# Patient Record
Sex: FEMALE | Race: WHITE | NOT HISPANIC OR LATINO | URBAN - METROPOLITAN AREA
[De-identification: names, ages, dates, MRNs, and addresses within clinical notes are randomized per-mention and may not be internally consistent; named-entity substitution may affect disease eponyms.]

---

## 2017-02-10 ENCOUNTER — OUTPATIENT (OUTPATIENT)
Dept: OUTPATIENT SERVICES | Facility: HOSPITAL | Age: 77
LOS: 1 days | End: 2017-02-10

## 2017-02-10 ENCOUNTER — OUTPATIENT (OUTPATIENT)
Dept: OUTPATIENT SERVICES | Facility: HOSPITAL | Age: 77
LOS: 1 days | End: 2017-02-10
Payer: MEDICARE

## 2017-02-10 VITALS
WEIGHT: 184.97 LBS | HEART RATE: 63 BPM | DIASTOLIC BLOOD PRESSURE: 61 MMHG | SYSTOLIC BLOOD PRESSURE: 135 MMHG | TEMPERATURE: 98 F | RESPIRATION RATE: 14 BRPM

## 2017-02-10 DIAGNOSIS — Z87.59 PERSONAL HISTORY OF OTHER COMPLICATIONS OF PREGNANCY, CHILDBIRTH AND THE PUERPERIUM: Chronic | ICD-10-CM

## 2017-02-10 DIAGNOSIS — R85.613 HIGH GRADE SQUAMOUS INTRAEPITHELIAL LESION ON CYTOLOGIC SMEAR OF ANUS (HGSIL): ICD-10-CM

## 2017-02-10 DIAGNOSIS — Z98.49 CATARACT EXTRACTION STATUS, UNSPECIFIED EYE: Chronic | ICD-10-CM

## 2017-02-10 DIAGNOSIS — K62.9 DISEASE OF ANUS AND RECTUM, UNSPECIFIED: Chronic | ICD-10-CM

## 2017-02-10 DIAGNOSIS — Z41.9 ENCOUNTER FOR PROCEDURE FOR PURPOSES OTHER THAN REMEDYING HEALTH STATE, UNSPECIFIED: Chronic | ICD-10-CM

## 2017-02-10 DIAGNOSIS — Z98.89 OTHER SPECIFIED POSTPROCEDURAL STATES: Chronic | ICD-10-CM

## 2017-02-10 DIAGNOSIS — Z98.890 OTHER SPECIFIED POSTPROCEDURAL STATES: Chronic | ICD-10-CM

## 2017-02-10 DIAGNOSIS — Z01.818 ENCOUNTER FOR OTHER PREPROCEDURAL EXAMINATION: ICD-10-CM

## 2017-02-10 DIAGNOSIS — E55.9 VITAMIN D DEFICIENCY, UNSPECIFIED: ICD-10-CM

## 2017-02-10 DIAGNOSIS — E78.00 PURE HYPERCHOLESTEROLEMIA, UNSPECIFIED: ICD-10-CM

## 2017-02-10 LAB
ALBUMIN SERPL ELPH-MCNC: 3.4 G/DL — SIGNIFICANT CHANGE UP (ref 3.3–5)
ALP SERPL-CCNC: 78 U/L — SIGNIFICANT CHANGE UP (ref 40–120)
ALT FLD-CCNC: 29 U/L — SIGNIFICANT CHANGE UP (ref 12–78)
ANION GAP SERPL CALC-SCNC: 8 MMOL/L — SIGNIFICANT CHANGE UP (ref 5–17)
AST SERPL-CCNC: 28 U/L — SIGNIFICANT CHANGE UP (ref 15–37)
BILIRUB SERPL-MCNC: 0.6 MG/DL — SIGNIFICANT CHANGE UP (ref 0.2–1.2)
BUN SERPL-MCNC: 17 MG/DL — SIGNIFICANT CHANGE UP (ref 7–23)
CALCIUM SERPL-MCNC: 8.9 MG/DL — SIGNIFICANT CHANGE UP (ref 8.5–10.1)
CHLORIDE SERPL-SCNC: 104 MMOL/L — SIGNIFICANT CHANGE UP (ref 96–108)
CO2 SERPL-SCNC: 29 MMOL/L — SIGNIFICANT CHANGE UP (ref 22–31)
CREAT SERPL-MCNC: 0.72 MG/DL — SIGNIFICANT CHANGE UP (ref 0.5–1.3)
GLUCOSE SERPL-MCNC: 92 MG/DL — SIGNIFICANT CHANGE UP (ref 70–99)
HCT VFR BLD CALC: 45.3 % — HIGH (ref 34.5–45)
HGB BLD-MCNC: 14.5 G/DL — SIGNIFICANT CHANGE UP (ref 11.5–15.5)
MCHC RBC-ENTMCNC: 31.5 PG — SIGNIFICANT CHANGE UP (ref 27–34)
MCHC RBC-ENTMCNC: 32 GM/DL — SIGNIFICANT CHANGE UP (ref 32–36)
MCV RBC AUTO: 98.4 FL — SIGNIFICANT CHANGE UP (ref 80–100)
PLATELET # BLD AUTO: 253 K/UL — SIGNIFICANT CHANGE UP (ref 150–400)
POTASSIUM SERPL-MCNC: 4.2 MMOL/L — SIGNIFICANT CHANGE UP (ref 3.5–5.3)
POTASSIUM SERPL-SCNC: 4.2 MMOL/L — SIGNIFICANT CHANGE UP (ref 3.5–5.3)
PROT SERPL-MCNC: 7.4 G/DL — SIGNIFICANT CHANGE UP (ref 6–8.3)
RBC # BLD: 4.6 M/UL — SIGNIFICANT CHANGE UP (ref 3.8–5.2)
RBC # FLD: 13.2 % — SIGNIFICANT CHANGE UP (ref 10.3–14.5)
SODIUM SERPL-SCNC: 141 MMOL/L — SIGNIFICANT CHANGE UP (ref 135–145)
WBC # BLD: 6.6 K/UL — SIGNIFICANT CHANGE UP (ref 3.8–10.5)
WBC # FLD AUTO: 6.6 K/UL — SIGNIFICANT CHANGE UP (ref 3.8–10.5)

## 2017-02-10 PROCEDURE — 93005 ELECTROCARDIOGRAM TRACING: CPT

## 2017-02-10 PROCEDURE — 80061 LIPID PANEL: CPT

## 2017-02-10 PROCEDURE — 85027 COMPLETE CBC AUTOMATED: CPT

## 2017-02-10 PROCEDURE — G0463: CPT

## 2017-02-10 PROCEDURE — 80053 COMPREHEN METABOLIC PANEL: CPT

## 2017-02-10 PROCEDURE — 93010 ELECTROCARDIOGRAM REPORT: CPT | Mod: NC

## 2017-02-10 PROCEDURE — 82306 VITAMIN D 25 HYDROXY: CPT

## 2017-02-10 NOTE — H&P PST ADULT - PMH
Anal lesion  biopsy in April 2016 and EUA July 2016  Anxiety    Essential hypertension    Kidney stone    Macular degeneration    Uncomplicated asthma, unspecified asthma severity Anal lesion  biopsy in April 2016 and EUA (July & October 2016)  Anxiety    Essential hypertension    Macular degeneration  (Right eye)  Uncomplicated asthma, unspecified asthma severity Anal lesion  biopsy in April 2016 and EUA (July & October 2016)  Anxiety    Essential hypertension    High grade squamous intraepithelial lesion on cytologic smear of anus (HGSIL)    Macular degeneration  (Right eye)  Uncomplicated asthma, unspecified asthma severity

## 2017-02-10 NOTE — H&P PST ADULT - HISTORY OF PRESENT ILLNESS
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 Needs another surgery to take more tissue from the area to avoid radiation. Denies any pain or bleeding during bowel movement.   pre op testing today. 76year old female s/p rectal biopsy done in April 2016, was told it is cancer in situ. Pt had surgery s/p rectal exam under anesthesia biopsy of anal lesion in July and October 2016. Pt denies abdominal pain and denies melena and hematochezia. Pt electing for biopsy of canal with high resolution anoscopy, excision and fulguration of anal condyloma on 2/21/17.

## 2017-02-10 NOTE — H&P PST ADULT - NSANTHOSAYNRD_GEN_A_CORE
No. SARAH screening performed.  STOP BANG Legend: 0-2 = LOW Risk; 3-4 = INTERMEDIATE Risk; 5-8 = HIGH Risk

## 2017-02-10 NOTE — H&P PST ADULT - GASTROINTESTINAL DETAILS
no masses palpable/nontender/soft/no distention/bowel sounds normal/normal no masses palpable/no distention/nontender/bowel sounds normal/no rigidity/no bruit/soft/no rebound tenderness/no guarding/no organomegaly/normal

## 2017-02-10 NOTE — H&P PST ADULT - ASSESSMENT
74 y/o female with anal mass, scheduled for biopsy of anal canal with high grade resolution anoscopy, excision and fulguration anal condyloma.  pre op testing today. Medical and cardiac eval pre op. 75yo female with high grade squamous intraepithelial lesion on cytologic smear of anus.

## 2017-02-10 NOTE — H&P PST ADULT - PSH
Anal lesion  biopsy in April 2016  S/P ectopic pregnancy    S/P knee surgery    S/P removal of ovarian cyst    Status post cataract extraction, unspecified laterality Anal lesion  biopsy in April 2016  S/P ectopic pregnancy    S/P knee surgery  (Left knee, 6/2014)  S/P myomectomy    Status post cataract extraction, unspecified laterality  (2000) Anal lesion  biopsy in April 2016  Elective surgery  (Biopsy of anal canal, 7/2016 & 10/2016)  S/P ectopic pregnancy    S/P knee surgery  (Left knee, 6/2014)  S/P myomectomy    Status post cataract extraction, unspecified laterality  (2000)

## 2017-02-10 NOTE — H&P PST ADULT - NEGATIVE CARDIOVASCULAR SYMPTOMS
no chest pain/no claudication/no peripheral edema/no orthopnea/no paroxysmal nocturnal dyspnea/no dyspnea on exertion/no palpitations

## 2017-02-10 NOTE — H&P PST ADULT - PROBLEM SELECTOR PLAN 1
Scheduled for biopsy of anal canal with high grade resolution anoscopy, excision and fulguration anal condyloma. Biopsy of canal with high resolution anoscopy, excision and fulguration of anal condyloma on 2/21/17.   Medical clearance needed.   CBC, Comprehensive panel & EKG ordered.   Pre-op instructions given and pt verbalized understanding. Biopsy of canal with high resolution anoscopy, excision and fulguration of anal condyloma on 2/21/17. Bowel prep given to pt by surgeon.   Medical clearance needed.   CBC, Comprehensive panel & EKG ordered.   Pre-op instructions given and pt verbalized understanding.

## 2017-02-20 ENCOUNTER — RESULT REVIEW (OUTPATIENT)
Age: 77
End: 2017-02-20

## 2017-02-21 ENCOUNTER — APPOINTMENT (OUTPATIENT)
Dept: COLORECTAL SURGERY | Facility: HOSPITAL | Age: 77
End: 2017-02-21

## 2017-02-21 ENCOUNTER — OUTPATIENT (OUTPATIENT)
Dept: OUTPATIENT SERVICES | Facility: HOSPITAL | Age: 77
LOS: 1 days | Discharge: HOME | End: 2017-02-21
Payer: MEDICARE

## 2017-02-21 VITALS
WEIGHT: 184.97 LBS | SYSTOLIC BLOOD PRESSURE: 162 MMHG | HEART RATE: 69 BPM | TEMPERATURE: 98 F | DIASTOLIC BLOOD PRESSURE: 82 MMHG | RESPIRATION RATE: 16 BRPM | OXYGEN SATURATION: 98 %

## 2017-02-21 VITALS
RESPIRATION RATE: 14 BRPM | OXYGEN SATURATION: 97 % | HEART RATE: 66 BPM | SYSTOLIC BLOOD PRESSURE: 130 MMHG | DIASTOLIC BLOOD PRESSURE: 70 MMHG

## 2017-02-21 DIAGNOSIS — K62.9 DISEASE OF ANUS AND RECTUM, UNSPECIFIED: Chronic | ICD-10-CM

## 2017-02-21 DIAGNOSIS — Z41.9 ENCOUNTER FOR PROCEDURE FOR PURPOSES OTHER THAN REMEDYING HEALTH STATE, UNSPECIFIED: Chronic | ICD-10-CM

## 2017-02-21 DIAGNOSIS — Z98.49 CATARACT EXTRACTION STATUS, UNSPECIFIED EYE: Chronic | ICD-10-CM

## 2017-02-21 DIAGNOSIS — Z98.89 OTHER SPECIFIED POSTPROCEDURAL STATES: Chronic | ICD-10-CM

## 2017-02-21 DIAGNOSIS — Z87.59 PERSONAL HISTORY OF OTHER COMPLICATIONS OF PREGNANCY, CHILDBIRTH AND THE PUERPERIUM: Chronic | ICD-10-CM

## 2017-02-21 DIAGNOSIS — R85.613 HIGH GRADE SQUAMOUS INTRAEPITHELIAL LESION ON CYTOLOGIC SMEAR OF ANUS (HGSIL): ICD-10-CM

## 2017-02-21 DIAGNOSIS — Z98.890 OTHER SPECIFIED POSTPROCEDURAL STATES: Chronic | ICD-10-CM

## 2017-02-21 PROCEDURE — 46930 DESTROY INTERNAL HEMORRHOIDS: CPT

## 2017-02-21 PROCEDURE — C1889: CPT

## 2017-02-21 PROCEDURE — 46607 DIAGNOSTIC ANOSCOPY & BIOPSY: CPT

## 2017-02-21 PROCEDURE — 46922 EXCISION OF ANAL LESION(S): CPT

## 2017-02-21 PROCEDURE — 46604 ANOSCOPY AND DILATION: CPT

## 2017-02-21 PROCEDURE — 88305 TISSUE EXAM BY PATHOLOGIST: CPT

## 2017-02-21 PROCEDURE — 88305 TISSUE EXAM BY PATHOLOGIST: CPT | Mod: 26

## 2017-02-21 RX ORDER — SIMVASTATIN 20 MG/1
1 TABLET, FILM COATED ORAL
Qty: 0 | Refills: 0 | COMMUNITY

## 2017-02-21 RX ORDER — SODIUM CHLORIDE 9 MG/ML
1000 INJECTION, SOLUTION INTRAVENOUS
Qty: 0 | Refills: 0 | Status: DISCONTINUED | OUTPATIENT
Start: 2017-02-21 | End: 2017-02-21

## 2017-02-21 RX ORDER — ONDANSETRON 8 MG/1
4 TABLET, FILM COATED ORAL ONCE
Qty: 0 | Refills: 0 | Status: DISCONTINUED | OUTPATIENT
Start: 2017-02-21 | End: 2017-02-21

## 2017-02-21 RX ADMIN — SODIUM CHLORIDE 75 MILLILITER(S): 9 INJECTION, SOLUTION INTRAVENOUS at 12:35

## 2017-02-21 NOTE — BRIEF OPERATIVE NOTE - PROCEDURE
High resolution anoscopy with biopsy  02/21/2017    Active  CSANZ  Excision and fulguration, condyloma  02/21/2017  Anal lesion  Active  CSANZ

## 2017-02-21 NOTE — ASU PATIENT PROFILE, ADULT - PSH
Anal lesion  biopsy in April 2016  Elective surgery  (Biopsy of anal canal, 7/2016 & 10/2016)  S/P ectopic pregnancy    S/P knee surgery  (Left knee, 6/2014)  S/P myomectomy    Status post cataract extraction, unspecified laterality  (2000)

## 2017-02-21 NOTE — ASU DISCHARGE PLAN (ADULT/PEDIATRIC). - ASU FOLLOWUP
[phone] or go to the nearest Emergency Room EMELI BRYANT (Adult):/[facility] [phone]

## 2017-02-21 NOTE — ASU PATIENT PROFILE, ADULT - PMH
Anal lesion  biopsy in April 2016 and EUA (July & October 2016)  Anxiety    Essential hypertension    High grade squamous intraepithelial lesion on cytologic smear of anus (HGSIL)    Macular degeneration  (Right eye)  Uncomplicated asthma, unspecified asthma severity

## 2017-02-21 NOTE — BRIEF OPERATIVE NOTE - POST-OP DX
High grade dysplasia of anus  02/21/2017    Migel Ridley  Squamous cell cancer, anus  02/21/2017  Hisotry of  Migel Ridley

## 2017-02-21 NOTE — ASU DISCHARGE PLAN (ADULT/PEDIATRIC). - MEDICATION SUMMARY - MEDICATIONS TO TAKE
I will START or STAY ON the medications listed below when I get home from the hospital:    vitoryn  --  by mouth every other day (at bedtime)  -- Indication: For High grade squamous intraepithelial lesion on cytologic smear of anus (HGSIL)    aspirin 81 mg oral tablet  -- 1 tab(s) by mouth once a day  -- Indication: For HTN    lisinopril 20 mg oral tablet  -- 1 tab(s) by mouth once a day  -- Indication: For Htn    CeleXA 20 mg oral tablet  -- 1 tab(s) by mouth once a day  -- Indication: For aNTIDEPRESSANT    ALPRAZolam 0.5 mg oral tablet  -- 1 tab(s) by mouth as needed  -- Indication: For aNTI ANXIETY    Symbicort 160 mcg-4.5 mcg/inh inhalation aerosol  -- 2 puff(s) inhaled 2 times a day, As Needed  -- Indication: For bRONCHDILATORS    hydrochlorothiazide 25 mg oral tablet  -- 1 tab(s) by mouth once a day  -- Indication: For Htn    Klor-Con 10 mEq oral tablet, extended release  -- 1 tab(s) by mouth once a day  -- Indication: For Potassium    Fish Oil oral capsule  --  by mouth   -- Indication: For supplements    multivitamin  --   once a day  -- Indication: For Vitamins

## 2017-02-21 NOTE — BRIEF OPERATIVE NOTE - PRE-OP DX
High grade dysplasia of anus  02/21/2017    Active  Migel Charlton  Squamous cell cancer, anus  02/21/2017  History of  Active  Migel Charlton

## 2017-02-22 ENCOUNTER — TRANSCRIPTION ENCOUNTER (OUTPATIENT)
Age: 77
End: 2017-02-22

## 2017-02-22 LAB — SURGICAL PATHOLOGY FINAL REPORT - CH: SIGNIFICANT CHANGE UP

## 2017-02-24 DIAGNOSIS — Z79.891 LONG TERM (CURRENT) USE OF OPIATE ANALGESIC: ICD-10-CM

## 2017-02-24 DIAGNOSIS — Z88.1 ALLERGY STATUS TO OTHER ANTIBIOTIC AGENTS STATUS: ICD-10-CM

## 2017-02-24 DIAGNOSIS — Z91.013 ALLERGY TO SEAFOOD: ICD-10-CM

## 2017-02-24 DIAGNOSIS — Z79.82 LONG TERM (CURRENT) USE OF ASPIRIN: ICD-10-CM

## 2017-02-24 DIAGNOSIS — R85.613 HIGH GRADE SQUAMOUS INTRAEPITHELIAL LESION ON CYTOLOGIC SMEAR OF ANUS (HGSIL): ICD-10-CM

## 2017-02-24 DIAGNOSIS — Z79.899 OTHER LONG TERM (CURRENT) DRUG THERAPY: ICD-10-CM

## 2017-02-24 DIAGNOSIS — I10 ESSENTIAL (PRIMARY) HYPERTENSION: ICD-10-CM

## 2017-02-24 DIAGNOSIS — K62.89 OTHER SPECIFIED DISEASES OF ANUS AND RECTUM: ICD-10-CM

## 2017-02-24 DIAGNOSIS — F41.9 ANXIETY DISORDER, UNSPECIFIED: ICD-10-CM

## 2017-02-24 DIAGNOSIS — Z88.0 ALLERGY STATUS TO PENICILLIN: ICD-10-CM

## 2017-02-24 DIAGNOSIS — J45.909 UNSPECIFIED ASTHMA, UNCOMPLICATED: ICD-10-CM

## 2017-02-24 DIAGNOSIS — Z88.5 ALLERGY STATUS TO NARCOTIC AGENT: ICD-10-CM

## 2017-02-24 DIAGNOSIS — Z85.048 PERSONAL HISTORY OF OTHER MALIGNANT NEOPLASM OF RECTUM, RECTOSIGMOID JUNCTION, AND ANUS: ICD-10-CM

## 2017-02-24 DIAGNOSIS — Z79.51 LONG TERM (CURRENT) USE OF INHALED STEROIDS: ICD-10-CM

## 2017-03-01 ENCOUNTER — OTHER (OUTPATIENT)
Age: 77
End: 2017-03-01

## 2017-03-15 ENCOUNTER — APPOINTMENT (OUTPATIENT)
Dept: COLORECTAL SURGERY | Facility: CLINIC | Age: 77
End: 2017-03-15

## 2017-03-15 VITALS
BODY MASS INDEX: 29.32 KG/M2 | WEIGHT: 176 LBS | OXYGEN SATURATION: 98 % | HEART RATE: 62 BPM | RESPIRATION RATE: 14 BRPM | HEIGHT: 65 IN | TEMPERATURE: 98.3 F | DIASTOLIC BLOOD PRESSURE: 91 MMHG | SYSTOLIC BLOOD PRESSURE: 195 MMHG

## 2017-03-15 DIAGNOSIS — K62.82 DYSPLASIA OF ANUS: ICD-10-CM

## 2017-03-15 DIAGNOSIS — C21.0 MALIGNANT NEOPLASM OF ANUS, UNSPECIFIED: ICD-10-CM

## 2017-03-21 ENCOUNTER — APPOINTMENT (OUTPATIENT)
Dept: COLORECTAL SURGERY | Facility: HOSPITAL | Age: 77
End: 2017-03-21

## 2017-06-07 ENCOUNTER — APPOINTMENT (OUTPATIENT)
Dept: COLORECTAL SURGERY | Facility: CLINIC | Age: 77
End: 2017-06-07

## 2017-11-09 ENCOUNTER — OUTPATIENT (OUTPATIENT)
Dept: OUTPATIENT SERVICES | Facility: HOSPITAL | Age: 77
LOS: 1 days | End: 2017-11-09
Payer: MEDICARE

## 2017-11-09 VITALS
TEMPERATURE: 98 F | HEART RATE: 74 BPM | SYSTOLIC BLOOD PRESSURE: 129 MMHG | DIASTOLIC BLOOD PRESSURE: 82 MMHG | RESPIRATION RATE: 15 BRPM | HEIGHT: 64 IN | WEIGHT: 169.98 LBS

## 2017-11-09 DIAGNOSIS — Z01.818 ENCOUNTER FOR OTHER PREPROCEDURAL EXAMINATION: ICD-10-CM

## 2017-11-09 DIAGNOSIS — Z98.49 CATARACT EXTRACTION STATUS, UNSPECIFIED EYE: Chronic | ICD-10-CM

## 2017-11-09 DIAGNOSIS — Z87.59 PERSONAL HISTORY OF OTHER COMPLICATIONS OF PREGNANCY, CHILDBIRTH AND THE PUERPERIUM: Chronic | ICD-10-CM

## 2017-11-09 DIAGNOSIS — A63.0 ANOGENITAL (VENEREAL) WARTS: ICD-10-CM

## 2017-11-09 DIAGNOSIS — Z41.9 ENCOUNTER FOR PROCEDURE FOR PURPOSES OTHER THAN REMEDYING HEALTH STATE, UNSPECIFIED: Chronic | ICD-10-CM

## 2017-11-09 DIAGNOSIS — Z98.890 OTHER SPECIFIED POSTPROCEDURAL STATES: Chronic | ICD-10-CM

## 2017-11-09 DIAGNOSIS — K62.9 DISEASE OF ANUS AND RECTUM, UNSPECIFIED: Chronic | ICD-10-CM

## 2017-11-09 DIAGNOSIS — Z98.89 OTHER SPECIFIED POSTPROCEDURAL STATES: Chronic | ICD-10-CM

## 2017-11-09 LAB
ALBUMIN SERPL ELPH-MCNC: 3.5 G/DL — SIGNIFICANT CHANGE UP (ref 3.3–5)
ALP SERPL-CCNC: 77 U/L — SIGNIFICANT CHANGE UP (ref 40–120)
ALT FLD-CCNC: 26 U/L — SIGNIFICANT CHANGE UP (ref 12–78)
ANION GAP SERPL CALC-SCNC: 11 MMOL/L — SIGNIFICANT CHANGE UP (ref 5–17)
AST SERPL-CCNC: 21 U/L — SIGNIFICANT CHANGE UP (ref 15–37)
BILIRUB SERPL-MCNC: 0.5 MG/DL — SIGNIFICANT CHANGE UP (ref 0.2–1.2)
BUN SERPL-MCNC: 18 MG/DL — SIGNIFICANT CHANGE UP (ref 7–23)
CALCIUM SERPL-MCNC: 9.1 MG/DL — SIGNIFICANT CHANGE UP (ref 8.5–10.1)
CHLORIDE SERPL-SCNC: 100 MMOL/L — SIGNIFICANT CHANGE UP (ref 96–108)
CO2 SERPL-SCNC: 29 MMOL/L — SIGNIFICANT CHANGE UP (ref 22–31)
CREAT SERPL-MCNC: 0.68 MG/DL — SIGNIFICANT CHANGE UP (ref 0.5–1.3)
GLUCOSE SERPL-MCNC: 94 MG/DL — SIGNIFICANT CHANGE UP (ref 70–99)
HCT VFR BLD CALC: 44.2 % — SIGNIFICANT CHANGE UP (ref 34.5–45)
HGB BLD-MCNC: 14.2 G/DL — SIGNIFICANT CHANGE UP (ref 11.5–15.5)
MCHC RBC-ENTMCNC: 31.6 PG — SIGNIFICANT CHANGE UP (ref 27–34)
MCHC RBC-ENTMCNC: 32.1 GM/DL — SIGNIFICANT CHANGE UP (ref 32–36)
MCV RBC AUTO: 98.5 FL — SIGNIFICANT CHANGE UP (ref 80–100)
PLATELET # BLD AUTO: 235 K/UL — SIGNIFICANT CHANGE UP (ref 150–400)
POTASSIUM SERPL-MCNC: 4.2 MMOL/L — SIGNIFICANT CHANGE UP (ref 3.5–5.3)
POTASSIUM SERPL-SCNC: 4.2 MMOL/L — SIGNIFICANT CHANGE UP (ref 3.5–5.3)
PROT SERPL-MCNC: 7.5 G/DL — SIGNIFICANT CHANGE UP (ref 6–8.3)
RBC # BLD: 4.49 M/UL — SIGNIFICANT CHANGE UP (ref 3.8–5.2)
RBC # FLD: 13.1 % — SIGNIFICANT CHANGE UP (ref 10.3–14.5)
SODIUM SERPL-SCNC: 140 MMOL/L — SIGNIFICANT CHANGE UP (ref 135–145)
WBC # BLD: 6.7 K/UL — SIGNIFICANT CHANGE UP (ref 3.8–10.5)
WBC # FLD AUTO: 6.7 K/UL — SIGNIFICANT CHANGE UP (ref 3.8–10.5)

## 2017-11-09 PROCEDURE — 85027 COMPLETE CBC AUTOMATED: CPT

## 2017-11-09 PROCEDURE — G0463: CPT

## 2017-11-09 PROCEDURE — 93010 ELECTROCARDIOGRAM REPORT: CPT | Mod: NC

## 2017-11-09 PROCEDURE — 80053 COMPREHEN METABOLIC PANEL: CPT

## 2017-11-09 PROCEDURE — 93005 ELECTROCARDIOGRAM TRACING: CPT

## 2017-11-09 NOTE — H&P PST ADULT - PMH
Anal cancer    Anxiety    Aorta aneurysm  4.6 cm  as per pt  on CT   2017  Essential hypertension    High grade squamous intraepithelial lesion on cytologic smear of anus (HGSIL)    Macular degeneration  (Right eye)  Uncomplicated asthma, unspecified asthma severity

## 2017-11-20 RX ORDER — SODIUM CHLORIDE 9 MG/ML
1000 INJECTION, SOLUTION INTRAVENOUS
Qty: 0 | Refills: 0 | Status: DISCONTINUED | OUTPATIENT
Start: 2017-11-21 | End: 2017-11-21

## 2017-11-21 ENCOUNTER — RESULT REVIEW (OUTPATIENT)
Age: 77
End: 2017-11-21

## 2017-11-21 ENCOUNTER — OUTPATIENT (OUTPATIENT)
Dept: OUTPATIENT SERVICES | Facility: HOSPITAL | Age: 77
LOS: 1 days | End: 2017-11-21
Payer: MEDICARE

## 2017-11-21 ENCOUNTER — TRANSCRIPTION ENCOUNTER (OUTPATIENT)
Age: 77
End: 2017-11-21

## 2017-11-21 VITALS
OXYGEN SATURATION: 99 % | DIASTOLIC BLOOD PRESSURE: 80 MMHG | TEMPERATURE: 99 F | HEART RATE: 59 BPM | RESPIRATION RATE: 15 BRPM | WEIGHT: 169.98 LBS | SYSTOLIC BLOOD PRESSURE: 134 MMHG | HEIGHT: 64 IN

## 2017-11-21 VITALS
DIASTOLIC BLOOD PRESSURE: 70 MMHG | OXYGEN SATURATION: 97 % | SYSTOLIC BLOOD PRESSURE: 146 MMHG | TEMPERATURE: 98 F | HEART RATE: 56 BPM | RESPIRATION RATE: 12 BRPM

## 2017-11-21 DIAGNOSIS — Z98.890 OTHER SPECIFIED POSTPROCEDURAL STATES: Chronic | ICD-10-CM

## 2017-11-21 DIAGNOSIS — Z87.59 PERSONAL HISTORY OF OTHER COMPLICATIONS OF PREGNANCY, CHILDBIRTH AND THE PUERPERIUM: Chronic | ICD-10-CM

## 2017-11-21 DIAGNOSIS — Z98.89 OTHER SPECIFIED POSTPROCEDURAL STATES: Chronic | ICD-10-CM

## 2017-11-21 DIAGNOSIS — Z41.9 ENCOUNTER FOR PROCEDURE FOR PURPOSES OTHER THAN REMEDYING HEALTH STATE, UNSPECIFIED: Chronic | ICD-10-CM

## 2017-11-21 DIAGNOSIS — K62.9 DISEASE OF ANUS AND RECTUM, UNSPECIFIED: Chronic | ICD-10-CM

## 2017-11-21 DIAGNOSIS — Z01.818 ENCOUNTER FOR OTHER PREPROCEDURAL EXAMINATION: ICD-10-CM

## 2017-11-21 DIAGNOSIS — Z98.49 CATARACT EXTRACTION STATUS, UNSPECIFIED EYE: Chronic | ICD-10-CM

## 2017-11-21 DIAGNOSIS — A63.0 ANOGENITAL (VENEREAL) WARTS: ICD-10-CM

## 2017-11-21 PROCEDURE — 88305 TISSUE EXAM BY PATHOLOGIST: CPT | Mod: 26

## 2017-11-21 PROCEDURE — 88305 TISSUE EXAM BY PATHOLOGIST: CPT

## 2017-11-21 PROCEDURE — 46922 EXCISION OF ANAL LESION(S): CPT

## 2017-11-21 PROCEDURE — C1889: CPT

## 2017-11-21 RX ORDER — SODIUM CHLORIDE 9 MG/ML
1000 INJECTION, SOLUTION INTRAVENOUS
Qty: 0 | Refills: 0 | Status: DISCONTINUED | OUTPATIENT
Start: 2017-11-21 | End: 2017-11-21

## 2017-11-21 RX ORDER — LISINOPRIL 2.5 MG/1
1 TABLET ORAL
Qty: 0 | Refills: 0 | COMMUNITY

## 2017-11-21 RX ORDER — POTASSIUM CHLORIDE 20 MEQ
1 PACKET (EA) ORAL
Qty: 0 | Refills: 0 | COMMUNITY

## 2017-11-21 RX ORDER — HYDROMORPHONE HYDROCHLORIDE 2 MG/ML
0.5 INJECTION INTRAMUSCULAR; INTRAVENOUS; SUBCUTANEOUS ONCE
Qty: 0 | Refills: 0 | Status: DISCONTINUED | OUTPATIENT
Start: 2017-11-21 | End: 2017-11-21

## 2017-11-21 RX ORDER — BUDESONIDE AND FORMOTEROL FUMARATE DIHYDRATE 160; 4.5 UG/1; UG/1
2 AEROSOL RESPIRATORY (INHALATION)
Qty: 0 | Refills: 0 | COMMUNITY

## 2017-11-21 RX ADMIN — SODIUM CHLORIDE 100 MILLILITER(S): 9 INJECTION, SOLUTION INTRAVENOUS at 11:50

## 2017-11-21 RX ADMIN — SODIUM CHLORIDE 60 MILLILITER(S): 9 INJECTION, SOLUTION INTRAVENOUS at 09:49

## 2017-11-21 NOTE — BRIEF OPERATIVE NOTE - PROCEDURE
<<-----Click on this checkbox to enter Procedure Excision and fulguration of lesion of perineum  11/21/2017    Active  CSANZ  Anoscopy with biopsy  11/21/2017    Active  CSANZ  Exam under anesthesia, anorectal  11/21/2017    Active  CSANZ

## 2017-11-21 NOTE — ASU DISCHARGE PLAN (ADULT/PEDIATRIC). - DIET
no change/High fiber diet and increase fluid intake. increase fluids/High fiber diet and increase fluid intake.

## 2017-11-21 NOTE — ASU DISCHARGE PLAN (ADULT/PEDIATRIC). - MEDICATION SUMMARY - MEDICATIONS TO TAKE
I will START or STAY ON the medications listed below when I get home from the hospital:    vitoryn  --  by mouth every other day (at bedtime)  -- Indication: For Home medication    aspirin 81 mg oral tablet  -- 1 tab(s) by mouth once a day  -- Indication: For Cardiac prophalxsis    lisinopril 20 mg oral tablet  -- 1 tab(s) by mouth once a day  -- Indication: For HTN    CeleXA 20 mg oral tablet  -- 1 tab(s) by mouth once a day  -- Indication: For Depression    ALPRAZolam 0.5 mg oral tablet  -- 1 tab(s) by mouth as needed  -- Indication: For Anxiety    Symbicort 160 mcg-4.5 mcg/inh inhalation aerosol  -- 2 puff(s) inhaled 2 times a day, As Needed  -- Indication: For Asthma    hydrochlorothiazide 25 mg oral tablet  -- 1 tab(s) by mouth once a day  -- Indication: For HTN    Klor-Con 10 mEq oral tablet, extended release  -- 1 tab(s) by mouth once a day  -- Indication: For HTN    Fish Oil oral capsule  --  by mouth   -- Indication: For HTN    multivitamin  --   once a day  -- Indication: For Vitmin

## 2017-11-21 NOTE — ASU DISCHARGE PLAN (ADULT/PEDIATRIC). - NOTIFY
Bleeding that does not stop/Unable to Urinate/Fever greater than 101 Increased Irritability or Sluggishness/Bleeding that does not stop/Fever greater than 101/Unable to Urinate/Inability to Tolerate Liquids or Foods

## 2017-11-21 NOTE — ASU DISCHARGE PLAN (ADULT/PEDIATRIC). - DRESSING FT
Re,ove dressing in AM, pass packing like a bm, warm siz baths, pat dry, dry gauze on top. Rem,ove dressing in AM, pass packing like a bm, warm siz baths, pat dry, dry gauze on top.

## 2017-11-21 NOTE — BRIEF OPERATIVE NOTE - OPERATION/FINDINGS
Patient with using high resolution anoscopy biopsies were taken of aceto white and smaller areas fulgrated.

## 2017-11-21 NOTE — ASU DISCHARGE PLAN (ADULT/PEDIATRIC). - INSTRUCTIONS
*-10 glasses of [id number] per day.  Increase fiber and fiber supplement to produce BM.  Patient take fiber supplements twice a day.  Patient take 4-6 stool softeners while on pain medication if stools are hard.  Two tablespoons of Milk of Magnesium if no bm for 24 hours.

## 2017-11-22 LAB — SURGICAL PATHOLOGY FINAL REPORT - CH: SIGNIFICANT CHANGE UP

## 2021-11-08 NOTE — H&P PST ADULT - PSH
Well , 18 Months Old  Well-child exams are recommended visits with a health care provider to track your child's growth and development at certain ages. This sheet tells you what to expect during this visit.  Recommended immunizations  · Hepatitis B vaccine. The third dose of a 3-dose series should be given at age 6-18 months. The third dose should be given at least 16 weeks after the first dose and at least 8 weeks after the second dose.  · Diphtheria and tetanus toxoids and acellular pertussis (DTaP) vaccine. The fourth dose of a 5-dose series should be given at age 15-18 months. The fourth dose may be given 6 months or later after the third dose.  · Haemophilus influenzae type b (Hib) vaccine. Your child may get doses of this vaccine if needed to catch up on missed doses, or if he or she has certain high-risk conditions.  · Pneumococcal conjugate (PCV13) vaccine. Your child may get the final dose of this vaccine at this time if he or she:  ? Was given 3 doses before his or her first birthday.  ? Is at high risk for certain conditions.  ? Is on a delayed vaccine schedule in which the first dose was given at age 7 months or later.  · Inactivated poliovirus vaccine. The third dose of a 4-dose series should be given at age 6-18 months. The third dose should be given at least 4 weeks after the second dose.  · Influenza vaccine (flu shot). Starting at age 6 months, your child should be given the flu shot every year. Children between the ages of 6 months and 8 years who get the flu shot for the first time should get a second dose at least 4 weeks after the first dose. After that, only a single yearly (annual) dose is recommended.  · Your child may get doses of the following vaccines if needed to catch up on missed doses:  ? Measles, mumps, and rubella (MMR) vaccine.  ? Varicella vaccine.  · Hepatitis A vaccine. A 2-dose series of this vaccine should be given at age 12-23 months. The second dose should be  "given 6-18 months after the first dose. If your child has received only one dose of the vaccine by age 24 months, he or she should get a second dose 6-18 months after the first dose.  · Meningococcal conjugate vaccine. Children who have certain high-risk conditions, are present during an outbreak, or are traveling to a country with a high rate of meningitis should get this vaccine.  Your child may receive vaccines as individual doses or as more than one vaccine together in one shot (combination vaccines). Talk with your child's health care provider about the risks and benefits of combination vaccines.  Testing  Vision  · Your child's eyes will be assessed for normal structure (anatomy) and function (physiology). Your child may have more vision tests done depending on his or her risk factors.  Other tests    · Your child's health care provider will screen your child for growth (developmental) problems and autism spectrum disorder (ASD).  · Your child's health care provider may recommend checking blood pressure or screening for low red blood cell count (anemia), lead poisoning, or tuberculosis (TB). This depends on your child's risk factors.  General instructions  Parenting tips  · Praise your child's good behavior by giving your child your attention.  · Spend some one-on-one time with your child daily. Vary activities and keep activities short.  · Set consistent limits. Keep rules for your child clear, short, and simple.  · Provide your child with choices throughout the day.  · When giving your child instructions (not choices), avoid asking yes and no questions (\"Do you want a bath?\"). Instead, give clear instructions (\"Time for a bath.\").  · Recognize that your child has a limited ability to understand consequences at this age.  · Interrupt your child's inappropriate behavior and show him or her what to do instead. You can also remove your child from the situation and have him or her do a more appropriate " "activity.  · Avoid shouting at or spanking your child.  · If your child cries to get what he or she wants, wait until your child briefly calms down before you give him or her the item or activity. Also, model the words that your child should use (for example, \"cookie please\" or \"climb up\").  · Avoid situations or activities that may cause your child to have a temper tantrum, such as shopping trips.  Oral health    · Brush your child's teeth after meals and before bedtime. Use a small amount of non-fluoride toothpaste.  · Take your child to a dentist to discuss oral health.  · Give fluoride supplements or apply fluoride varnish to your child's teeth as told by your child's health care provider.  · Provide all beverages in a cup and not in a bottle. Doing this helps to prevent tooth decay.  · If your child uses a pacifier, try to stop giving it your child when he or she is awake.  Sleep  · At this age, children typically sleep 12 or more hours a day.  · Your child may start taking one nap a day in the afternoon. Let your child's morning nap naturally fade from your child's routine.  · Keep naptime and bedtime routines consistent.  · Have your child sleep in his or her own sleep space.  What's next?  Your next visit should take place when your child is 24 months old.  Summary  · Your child may receive immunizations based on the immunization schedule your health care provider recommends.  · Your child's health care provider may recommend testing blood pressure or screening for anemia, lead poisoning, or tuberculosis (TB). This depends on your child's risk factors.  · When giving your child instructions (not choices), avoid asking yes and no questions (\"Do you want a bath?\"). Instead, give clear instructions (\"Time for a bath.\").  · Take your child to a dentist to discuss oral health.  · Keep naptime and bedtime routines consistent.  This information is not intended to replace advice given to you by your health care " provider. Make sure you discuss any questions you have with your health care provider.  Document Released: 01/07/2008 Document Revised: 04/07/2020 Document Reviewed: 2019  Elsevier Patient Education © 2020 [employer]     Anal lesion  biopsy in April 2016  Elective surgery  (Biopsy of anal canal, 7/2016 & 10/2016)  S/P ectopic pregnancy    S/P knee surgery  (Left knee, 6/2014)  S/P myomectomy    Status post cataract extraction, unspecified laterality  (2000)

## 2022-03-24 NOTE — H&P PST ADULT - ASSESSMENT
76 yo F exam under anesthesia   biopsy of anal canal - high resolution anoscopy     Med cl pending      d/c asa
Statement Selected

## 2023-10-17 ENCOUNTER — OFFICE (OUTPATIENT)
Dept: URBAN - METROPOLITAN AREA CLINIC 88 | Facility: CLINIC | Age: 83
Setting detail: OPHTHALMOLOGY
End: 2023-10-17
Payer: MEDICARE

## 2023-10-17 DIAGNOSIS — H44.23: ICD-10-CM

## 2023-10-17 DIAGNOSIS — H43.813: ICD-10-CM

## 2023-10-17 DIAGNOSIS — H35.40: ICD-10-CM

## 2023-10-17 PROCEDURE — 92134 CPTRZ OPH DX IMG PST SGM RTA: CPT | Performed by: OPHTHALMOLOGY

## 2023-10-17 PROCEDURE — 92004 COMPRE OPH EXAM NEW PT 1/>: CPT | Performed by: OPHTHALMOLOGY

## 2023-10-17 ASSESSMENT — KERATOMETRY
OD_AXISANGLE_DEGREES: 99
METHOD_AUTO_MANUAL: AUTO
OS_AXISANGLE_DEGREES: 84
OD_K1POWER_DIOPTERS: 42.50
OS_K2POWER_DIOPTERS: 46.00
OD_K2POWER_DIOPTERS: 45.50
OS_K1POWER_DIOPTERS: 42.50

## 2023-10-17 ASSESSMENT — SPHEQUIV_DERIVED
OS_SPHEQUIV: -1.25
OD_SPHEQUIV: -1.125

## 2023-10-17 ASSESSMENT — TONOMETRY
OD_IOP_MMHG: 17
OS_IOP_MMHG: 18

## 2023-10-17 ASSESSMENT — CONFRONTATIONAL VISUAL FIELD TEST (CVF)
OD_FINDINGS: FULL
OS_FINDINGS: FULL

## 2023-10-17 ASSESSMENT — AXIALLENGTH_DERIVED
OD_AL: 23.8493
OS_AL: 23.8053

## 2023-10-17 ASSESSMENT — REFRACTION_AUTOREFRACTION
OD_SPHERE: +0.25
OD_CYLINDER: -2.75
OD_AXIS: 14
OS_CYLINDER: -4.00
OS_SPHERE: +0.75
OS_AXIS: 176

## 2023-10-17 ASSESSMENT — SUPERFICIAL PUNCTATE KERATITIS (SPK)
OD_SPK: +1
OS_SPK: +1

## 2023-10-17 ASSESSMENT — VISUAL ACUITY
OD_BCVA: 20/25
OS_BCVA: CF 5FT

## 2023-11-06 ENCOUNTER — OFFICE (OUTPATIENT)
Dept: URBAN - METROPOLITAN AREA CLINIC 12 | Facility: CLINIC | Age: 83
Setting detail: OPHTHALMOLOGY
End: 2023-11-06
Payer: MEDICARE

## 2023-11-06 DIAGNOSIS — H43.813: ICD-10-CM

## 2023-11-06 DIAGNOSIS — H26.492: ICD-10-CM

## 2023-11-06 DIAGNOSIS — H35.40: ICD-10-CM

## 2023-11-06 DIAGNOSIS — H16.223: ICD-10-CM

## 2023-11-06 DIAGNOSIS — H44.23: ICD-10-CM

## 2023-11-06 PROCEDURE — 92250 FUNDUS PHOTOGRAPHY W/I&R: CPT | Performed by: OPHTHALMOLOGY

## 2023-11-06 PROCEDURE — 92014 COMPRE OPH EXAM EST PT 1/>: CPT | Performed by: OPHTHALMOLOGY

## 2023-11-06 ASSESSMENT — REFRACTION_MANIFEST
OD_VA1: 20/300
OS_CYLINDER: -3.50
OD_SPHERE: +0.50
OD_AXIS: 015
OS_SPHERE: +0.50
OD_CYLINDER: -3.25
OS_AXIS: 180

## 2023-11-06 ASSESSMENT — REFRACTION_AUTOREFRACTION
OD_CYLINDER: -3.25
OD_AXIS: 014
OS_SPHERE: +0.50
OS_AXIS: 180
OD_SPHERE: +0.50
OS_CYLINDER: -3.50

## 2023-11-06 ASSESSMENT — SPHEQUIV_DERIVED
OS_SPHEQUIV: -1.25
OS_SPHEQUIV: -1.25
OD_SPHEQUIV: -1.125
OD_SPHEQUIV: -1.125

## 2023-11-06 ASSESSMENT — SUPERFICIAL PUNCTATE KERATITIS (SPK)
OD_SPK: 1+
OS_SPK: 1+

## 2023-11-06 ASSESSMENT — CONFRONTATIONAL VISUAL FIELD TEST (CVF)
OD_FINDINGS: FULL
OS_FINDINGS: FULL

## 2024-01-26 PROBLEM — R85.613: Chronic | Status: ACTIVE | Noted: 2017-02-10

## 2024-01-26 PROBLEM — I71.9 AORTIC ANEURYSM OF UNSPECIFIED SITE, WITHOUT RUPTURE: Chronic | Status: ACTIVE | Noted: 2017-11-09

## 2024-01-26 PROBLEM — C21.0 MALIGNANT NEOPLASM OF ANUS, UNSPECIFIED: Chronic | Status: ACTIVE | Noted: 2017-11-09

## 2024-01-30 ENCOUNTER — APPOINTMENT (OUTPATIENT)
Dept: ORTHOPEDIC SURGERY | Facility: CLINIC | Age: 84
End: 2024-01-30
Payer: MEDICARE

## 2024-01-30 DIAGNOSIS — S42.309A UNSPECIFIED FRACTURE OF SHAFT OF HUMERUS, UNSPECIFIED ARM, INITIAL ENCOUNTER FOR CLOSED FRACTURE: ICD-10-CM

## 2024-01-30 PROCEDURE — 99204 OFFICE O/P NEW MOD 45 MIN: CPT | Mod: 57

## 2024-01-30 PROCEDURE — 23600 CLTX PROX HUMRL FX W/O MNPJ: CPT | Mod: LT

## 2024-01-30 PROCEDURE — 73030 X-RAY EXAM OF SHOULDER: CPT | Mod: LT

## 2024-01-30 RX ORDER — SIMVASTATIN 20 MG/1
20 TABLET, FILM COATED ORAL
Refills: 0 | Status: ACTIVE | COMMUNITY

## 2024-01-30 RX ORDER — DONEPEZIL HYDROCHLORIDE 10 MG/1
10 TABLET ORAL
Refills: 0 | Status: ACTIVE | COMMUNITY

## 2024-01-30 RX ORDER — MEMANTINE HYDROCHLORIDE 10 MG/1
10 TABLET, FILM COATED ORAL
Refills: 0 | Status: ACTIVE | COMMUNITY

## 2024-01-30 RX ORDER — LISINOPRIL 20 MG/1
20 TABLET ORAL
Refills: 0 | Status: ACTIVE | COMMUNITY

## 2024-01-31 PROBLEM — S42.309A HUMERUS FRACTURE: Status: ACTIVE | Noted: 2024-01-30

## 2024-01-31 NOTE — IMAGING
[de-identified] : Left shoulder with minimal ecchymosis, +ttp anterolaterally. Able to make fist, oppose thumb to small finger and abduct fingers. Sensation intact throughout. <2sec cap refill.  Left shoulder radiographs with greater tuberosity fracture, minimally displaced - alignment maintained from prior radiographs (3-view)

## 2024-01-31 NOTE — ASSESSMENT
[FreeTextEntry1] : Left greater tuberosity fracture, minimally displaced - will manage with closed management [CPT 42188]  Reviewed radiographs with patient and daughter and discussed pathoanatomy. Discussed alignment is within acceptable parameters to manage with closed management in sling. Discussed risk of stiffness, arthrosis, pain, and displacement requiring operative intervention. NWB, elevate, NSAIDs prn. Continue sling through 4 weeks, begin PT thereafter and begin WBing around 7-8 weeks postinjury  F/u 4weeks; reassess, repeat films

## 2024-01-31 NOTE — HISTORY OF PRESENT ILLNESS
[de-identified] : 83F, RHD presents with left shoulder pain. Patient reports falling and landed on her left side at home injuring her shoulder on 1/11/24 went to [facility] the same day had XR imaging done and was advised of a fx.

## 2024-02-02 ENCOUNTER — OFFICE (OUTPATIENT)
Dept: URBAN - METROPOLITAN AREA CLINIC 88 | Facility: CLINIC | Age: 84
Setting detail: OPHTHALMOLOGY
End: 2024-02-02
Payer: MEDICARE

## 2024-02-02 DIAGNOSIS — H44.23: ICD-10-CM

## 2024-02-02 DIAGNOSIS — H35.3131: ICD-10-CM

## 2024-02-02 DIAGNOSIS — H43.813: ICD-10-CM

## 2024-02-02 DIAGNOSIS — H35.40: ICD-10-CM

## 2024-02-02 PROCEDURE — 92134 CPTRZ OPH DX IMG PST SGM RTA: CPT | Performed by: OPHTHALMOLOGY

## 2024-02-02 PROCEDURE — 92250 FUNDUS PHOTOGRAPHY W/I&R: CPT | Performed by: OPHTHALMOLOGY

## 2024-02-02 PROCEDURE — 99213 OFFICE O/P EST LOW 20 MIN: CPT | Performed by: OPHTHALMOLOGY

## 2024-02-02 ASSESSMENT — SPHEQUIV_DERIVED
OS_SPHEQUIV: -1.25
OD_SPHEQUIV: -1.125

## 2024-02-02 ASSESSMENT — CONFRONTATIONAL VISUAL FIELD TEST (CVF)
OD_FINDINGS: FULL
OS_FINDINGS: FULL

## 2024-02-02 ASSESSMENT — REFRACTION_AUTOREFRACTION
OD_SPHERE: +0.50
OD_CYLINDER: -3.25
OD_AXIS: 014
OS_SPHERE: +0.50
OS_CYLINDER: -3.50
OS_AXIS: 180

## 2024-02-02 ASSESSMENT — SUPERFICIAL PUNCTATE KERATITIS (SPK)
OD_SPK: 1+
OS_SPK: 1+

## 2024-08-21 ENCOUNTER — OFFICE (OUTPATIENT)
Dept: URBAN - METROPOLITAN AREA CLINIC 88 | Facility: CLINIC | Age: 84
Setting detail: OPHTHALMOLOGY
End: 2024-08-21
Payer: MEDICARE

## 2024-08-21 DIAGNOSIS — H35.40: ICD-10-CM

## 2024-08-21 DIAGNOSIS — H35.3131: ICD-10-CM

## 2024-08-21 DIAGNOSIS — H44.23: ICD-10-CM

## 2024-08-21 DIAGNOSIS — H43.813: ICD-10-CM

## 2024-08-21 PROCEDURE — 99213 OFFICE O/P EST LOW 20 MIN: CPT | Performed by: OPHTHALMOLOGY

## 2024-08-21 PROCEDURE — 92134 CPTRZ OPH DX IMG PST SGM RTA: CPT | Performed by: OPHTHALMOLOGY

## 2024-08-21 ASSESSMENT — CONFRONTATIONAL VISUAL FIELD TEST (CVF)
OD_FINDINGS: FULL
OS_FINDINGS: FULL

## 2024-10-21 ENCOUNTER — OFFICE (OUTPATIENT)
Dept: URBAN - METROPOLITAN AREA CLINIC 12 | Facility: CLINIC | Age: 84
Setting detail: OPHTHALMOLOGY
End: 2024-10-21
Payer: MEDICARE

## 2024-10-21 DIAGNOSIS — H16.223: ICD-10-CM

## 2024-10-21 DIAGNOSIS — H44.23: ICD-10-CM

## 2024-10-21 DIAGNOSIS — H35.3131: ICD-10-CM

## 2024-10-21 DIAGNOSIS — H43.813: ICD-10-CM

## 2024-10-21 DIAGNOSIS — H26.492: ICD-10-CM

## 2024-10-21 DIAGNOSIS — H35.40: ICD-10-CM

## 2024-10-21 PROCEDURE — 92014 COMPRE OPH EXAM EST PT 1/>: CPT | Performed by: OPHTHALMOLOGY

## 2024-10-21 PROCEDURE — 92134 CPTRZ OPH DX IMG PST SGM RTA: CPT | Performed by: OPHTHALMOLOGY

## 2024-10-21 ASSESSMENT — REFRACTION_MANIFEST
OS_SPHERE: +0.25
OD_CYLINDER: -3.00
OD_SPHERE: +0.25
OD_AXIS: 016
OS_AXIS: 178
OD_VA1: 20/400
OS_CYLINDER: -5.50
OS_VA1: 20/50

## 2024-10-21 ASSESSMENT — REFRACTION_AUTOREFRACTION
OD_AXIS: 016
OS_SPHERE: +0.25
OS_AXIS: 178
OD_CYLINDER: -3.00
OS_CYLINDER: -5.50
OD_SPHERE: +0.25

## 2024-10-21 ASSESSMENT — KERATOMETRY
OD_K2POWER_DIOPTERS: 45.75
METHOD_AUTO_MANUAL: AUTO
OD_K1POWER_DIOPTERS: 42.50
OD_AXISANGLE_DEGREES: 103
OS_AXISANGLE_DEGREES: 086
OS_K1POWER_DIOPTERS: 42.50
OS_K2POWER_DIOPTERS: 47.25

## 2024-10-21 ASSESSMENT — TONOMETRY
OD_IOP_MMHG: 19
OS_IOP_MMHG: 21

## 2024-10-21 ASSESSMENT — SUPERFICIAL PUNCTATE KERATITIS (SPK)
OD_SPK: 1+
OS_SPK: 1+

## 2024-10-21 ASSESSMENT — VISUAL ACUITY
OD_BCVA: 20/70
OS_BCVA: 20/600

## 2024-10-21 ASSESSMENT — CONFRONTATIONAL VISUAL FIELD TEST (CVF)
OD_FINDINGS: FULL
OS_FINDINGS: FULL

## 2025-08-04 ENCOUNTER — APPOINTMENT (OUTPATIENT)
Dept: CARDIOLOGY | Facility: CLINIC | Age: 85
End: 2025-08-04
Payer: MEDICARE

## 2025-08-04 VITALS
HEIGHT: 64 IN | BODY MASS INDEX: 26.46 KG/M2 | HEART RATE: 62 BPM | SYSTOLIC BLOOD PRESSURE: 122 MMHG | OXYGEN SATURATION: 97 % | DIASTOLIC BLOOD PRESSURE: 78 MMHG | WEIGHT: 155 LBS

## 2025-08-04 DIAGNOSIS — R06.09 OTHER FORMS OF DYSPNEA: ICD-10-CM

## 2025-08-04 DIAGNOSIS — F03.C11: ICD-10-CM

## 2025-08-04 DIAGNOSIS — I10 ESSENTIAL (PRIMARY) HYPERTENSION: ICD-10-CM

## 2025-08-04 DIAGNOSIS — E78.5 HYPERLIPIDEMIA, UNSPECIFIED: ICD-10-CM

## 2025-08-04 DIAGNOSIS — I71.21 ANEURYSM OF THE ASCENDING AORTA, WITHOUT RUPTURE: ICD-10-CM

## 2025-08-04 PROCEDURE — 99214 OFFICE O/P EST MOD 30 MIN: CPT

## 2025-08-04 PROCEDURE — 93000 ELECTROCARDIOGRAM COMPLETE: CPT

## 2025-08-04 RX ORDER — MESALAMINE 1.2 G/1
1.2 TABLET, DELAYED RELEASE ORAL
Refills: 0 | Status: ACTIVE | COMMUNITY

## 2025-08-04 RX ORDER — CRANBERRY FRUIT EXTRACT 200 MG
CAPSULE ORAL
Refills: 0 | Status: ACTIVE | COMMUNITY

## 2025-08-04 RX ORDER — ESCITALOPRAM OXALATE 10 MG/1
10 TABLET, FILM COATED ORAL
Refills: 0 | Status: ACTIVE | COMMUNITY

## 2025-08-04 RX ORDER — CHOLECALCIFEROL (VITAMIN D3) 25 MCG
TABLET ORAL
Refills: 0 | Status: ACTIVE | COMMUNITY

## 2025-08-07 VITALS
HEIGHT: 64 IN | DIASTOLIC BLOOD PRESSURE: 78 MMHG | SYSTOLIC BLOOD PRESSURE: 122 MMHG | WEIGHT: 155 LBS | HEART RATE: 78 BPM | BODY MASS INDEX: 26.46 KG/M2 | RESPIRATION RATE: 16 BRPM | OXYGEN SATURATION: 97 %

## 2025-08-07 PROBLEM — I10 PRIMARY HYPERTENSION: Status: ACTIVE | Noted: 2025-08-07

## 2025-08-07 PROBLEM — E78.5 DYSLIPIDEMIA: Status: ACTIVE | Noted: 2025-08-07

## 2025-08-07 PROBLEM — I71.21 ANEURYSM OF ASCENDING AORTA WITHOUT RUPTURE: Status: ACTIVE | Noted: 2025-08-07

## 2025-08-07 PROBLEM — F03.C11: Status: ACTIVE | Noted: 2025-08-07

## 2025-09-08 ENCOUNTER — APPOINTMENT (OUTPATIENT)
Dept: CARDIOLOGY | Facility: CLINIC | Age: 85
End: 2025-09-08
Payer: MEDICARE

## 2025-09-08 PROCEDURE — 93306 TTE W/DOPPLER COMPLETE: CPT

## 2025-09-15 ENCOUNTER — APPOINTMENT (OUTPATIENT)
Dept: CARDIOLOGY | Facility: CLINIC | Age: 85
End: 2025-09-15
Payer: MEDICARE

## 2025-09-15 PROCEDURE — 99214 OFFICE O/P EST MOD 30 MIN: CPT | Mod: 2W
